# Patient Record
Sex: MALE | Race: WHITE
[De-identification: names, ages, dates, MRNs, and addresses within clinical notes are randomized per-mention and may not be internally consistent; named-entity substitution may affect disease eponyms.]

---

## 2019-06-11 ENCOUNTER — HOSPITAL ENCOUNTER (EMERGENCY)
Dept: HOSPITAL 54 - ER | Age: 74
Discharge: HOME | End: 2019-06-11
Payer: MEDICARE

## 2019-06-11 VITALS — HEIGHT: 70 IN | WEIGHT: 165 LBS | BODY MASS INDEX: 23.62 KG/M2

## 2019-06-11 VITALS — DIASTOLIC BLOOD PRESSURE: 72 MMHG | SYSTOLIC BLOOD PRESSURE: 127 MMHG
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
## 2019-06-11 DIAGNOSIS — S60.041A: ICD-10-CM

## 2019-06-11 DIAGNOSIS — S61.216A: Primary | ICD-10-CM
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
## 2019-06-11 PROCEDURE — A6403 STERILE GAUZE>16 <= 48 SQ IN: HCPCS

## 2019-06-11 PROCEDURE — 99283 EMERGENCY DEPT VISIT LOW MDM: CPT

## 2019-06-11 PROCEDURE — A6402 STERILE GAUZE <= 16 SQ IN: HCPCS

## 2019-06-11 PROCEDURE — 90715 TDAP VACCINE 7 YRS/> IM: CPT

## 2019-06-11 PROCEDURE — 90471 IMMUNIZATION ADMIN: CPT

## 2019-06-11 PROCEDURE — 73130 X-RAY EXAM OF HAND: CPT

## 2019-06-11 PROCEDURE — 12001 RPR S/N/AX/GEN/TRNK 2.5CM/<: CPT

## 2019-06-11 NOTE — NUR
RIGHT HAND SPLINT INTACT. Patient discharged to home in stable condition. 
Written and verbal after care instructions given. Patient verbalizes 
understanding of instruction.

## 2019-06-11 NOTE — NUR
LACERATION TO 4TH AND 5TH DIGITS,R HAND, SUSTAINED WHILE WORKING WITH  A  SAW 
AT HOME. PATIENT IN NO DISTRESS, WIFE AT BEDSIDE. RIGHT HAND COVERED WITH A 
TOWEL AT THIS TIME, WAITING FOR MD PULIDO.